# Patient Record
Sex: FEMALE | Race: WHITE | NOT HISPANIC OR LATINO | URBAN - METROPOLITAN AREA
[De-identification: names, ages, dates, MRNs, and addresses within clinical notes are randomized per-mention and may not be internally consistent; named-entity substitution may affect disease eponyms.]

---

## 2018-08-03 ENCOUNTER — CONVERSION ENCOUNTER (OUTPATIENT)
Dept: GENERAL RADIOLOGY | Facility: HOSPITAL | Age: 63
End: 2018-08-03

## 2018-09-20 ENCOUNTER — OFFICE VISIT CONVERTED (OUTPATIENT)
Dept: SURGERY | Facility: CLINIC | Age: 63
End: 2018-09-20
Attending: SURGERY

## 2020-12-18 ENCOUNTER — HOSPITAL ENCOUNTER (OUTPATIENT)
Dept: GENERAL RADIOLOGY | Facility: HOSPITAL | Age: 65
Discharge: HOME | End: 2020-12-18

## 2021-01-26 ENCOUNTER — HOSPITAL ENCOUNTER (OUTPATIENT)
Dept: GENERAL RADIOLOGY | Facility: HOSPITAL | Age: 66
Discharge: HOME | End: 2021-01-26

## 2021-02-04 ENCOUNTER — OFFICE VISIT CONVERTED (OUTPATIENT)
Dept: FAMILY MEDICINE CLINIC | Facility: CLINIC | Age: 66
End: 2021-02-04
Attending: NURSE PRACTITIONER

## 2021-02-04 ENCOUNTER — CONVERSION ENCOUNTER (OUTPATIENT)
Dept: FAMILY MEDICINE CLINIC | Facility: CLINIC | Age: 66
End: 2021-02-04

## 2021-02-09 ENCOUNTER — HOSPITAL ENCOUNTER (OUTPATIENT)
Dept: FAMILY MEDICINE CLINIC | Facility: CLINIC | Age: 66
Discharge: HOME | End: 2021-02-09
Attending: NURSE PRACTITIONER

## 2021-02-10 LAB
25(OH)D3 SERPL-MCNC: 14.8 NG/ML (ref 30–100)
ALBUMIN SERPL-MCNC: 3.9 G/DL (ref 3.5–5)
ALBUMIN/GLOB SERPL: 1.6 {RATIO} (ref 1.4–2.6)
ALP SERPL-CCNC: 89 U/L (ref 43–160)
ALT SERPL-CCNC: 16 U/L (ref 10–40)
ANION GAP SERPL CALC-SCNC: 11 MMOL/L (ref 8–19)
AST SERPL-CCNC: 17 U/L (ref 15–50)
BASOPHILS # BLD AUTO: 0.04 10*3/UL (ref 0–0.2)
BASOPHILS NFR BLD AUTO: 0.7 % (ref 0–3)
BILIRUB SERPL-MCNC: 0.25 MG/DL (ref 0.2–1.3)
BUN SERPL-MCNC: 9 MG/DL (ref 5–25)
BUN/CREAT SERPL: 15 {RATIO} (ref 6–20)
CALCIUM SERPL-MCNC: 9.1 MG/DL (ref 8.7–10.4)
CHLORIDE SERPL-SCNC: 102 MMOL/L (ref 99–111)
CHOLEST SERPL-MCNC: 249 MG/DL (ref 107–200)
CHOLEST/HDLC SERPL: 5 {RATIO} (ref 3–6)
CONV ABS IMM GRAN: 0.02 10*3/UL (ref 0–0.2)
CONV CO2: 30 MMOL/L (ref 22–32)
CONV IMMATURE GRAN: 0.3 % (ref 0–1.8)
CONV TOTAL PROTEIN: 6.4 G/DL (ref 6.3–8.2)
CREAT UR-MCNC: 0.59 MG/DL (ref 0.5–0.9)
DEPRECATED RDW RBC AUTO: 43.8 FL (ref 36.4–46.3)
EOSINOPHIL # BLD AUTO: 0.15 10*3/UL (ref 0–0.7)
EOSINOPHIL # BLD AUTO: 2.6 % (ref 0–7)
ERYTHROCYTE [DISTWIDTH] IN BLOOD BY AUTOMATED COUNT: 13.2 % (ref 11.7–14.4)
GFR SERPLBLD BASED ON 1.73 SQ M-ARVRAT: >60 ML/MIN/{1.73_M2}
GLOBULIN UR ELPH-MCNC: 2.5 G/DL (ref 2–3.5)
GLUCOSE SERPL-MCNC: 92 MG/DL (ref 65–99)
HCT VFR BLD AUTO: 41.5 % (ref 37–47)
HDLC SERPL-MCNC: 50 MG/DL (ref 40–60)
HGB BLD-MCNC: 13.3 G/DL (ref 12–16)
LDLC SERPL CALC-MCNC: 133 MG/DL (ref 70–100)
LYMPHOCYTES # BLD AUTO: 2.27 10*3/UL (ref 1–5)
LYMPHOCYTES NFR BLD AUTO: 39.3 % (ref 20–45)
MCH RBC QN AUTO: 29.2 PG (ref 27–31)
MCHC RBC AUTO-ENTMCNC: 32 G/DL (ref 33–37)
MCV RBC AUTO: 91 FL (ref 81–99)
MONOCYTES # BLD AUTO: 0.38 10*3/UL (ref 0.2–1.2)
MONOCYTES NFR BLD AUTO: 6.6 % (ref 3–10)
NEUTROPHILS # BLD AUTO: 2.91 10*3/UL (ref 2–8)
NEUTROPHILS NFR BLD AUTO: 50.5 % (ref 30–85)
NRBC CBCN: 0 % (ref 0–0.7)
OSMOLALITY SERPL CALC.SUM OF ELEC: 286 MOSM/KG (ref 273–304)
PLATELET # BLD AUTO: 211 10*3/UL (ref 130–400)
PMV BLD AUTO: 11 FL (ref 9.4–12.3)
POTASSIUM SERPL-SCNC: 4.2 MMOL/L (ref 3.5–5.3)
RBC # BLD AUTO: 4.56 10*6/UL (ref 4.2–5.4)
SODIUM SERPL-SCNC: 139 MMOL/L (ref 135–147)
TRIGL SERPL-MCNC: 329 MG/DL (ref 40–150)
TSH SERPL-ACNC: 2.35 M[IU]/L (ref 0.27–4.2)
VLDLC SERPL-MCNC: 66 MG/DL (ref 5–37)
WBC # BLD AUTO: 5.77 10*3/UL (ref 4.8–10.8)

## 2021-05-05 ENCOUNTER — OFFICE VISIT CONVERTED (OUTPATIENT)
Dept: FAMILY MEDICINE CLINIC | Facility: CLINIC | Age: 66
End: 2021-05-05
Attending: NURSE PRACTITIONER

## 2021-05-05 ENCOUNTER — HOSPITAL ENCOUNTER (OUTPATIENT)
Dept: FAMILY MEDICINE CLINIC | Facility: CLINIC | Age: 66
Discharge: HOME | End: 2021-05-05
Attending: NURSE PRACTITIONER

## 2021-05-05 LAB
ALBUMIN SERPL-MCNC: 4.3 G/DL (ref 3.5–5)
ALBUMIN/GLOB SERPL: 1.4 {RATIO} (ref 1.4–2.6)
ALP SERPL-CCNC: 97 U/L (ref 43–160)
ALT SERPL-CCNC: 17 U/L (ref 10–40)
ANION GAP SERPL CALC-SCNC: 13 MMOL/L (ref 8–19)
AST SERPL-CCNC: 22 U/L (ref 15–50)
BILIRUB SERPL-MCNC: 0.39 MG/DL (ref 0.2–1.3)
BUN SERPL-MCNC: 7 MG/DL (ref 5–25)
BUN/CREAT SERPL: 10 {RATIO} (ref 6–20)
CALCIUM SERPL-MCNC: 10.1 MG/DL (ref 8.7–10.4)
CHLORIDE SERPL-SCNC: 104 MMOL/L (ref 99–111)
CHOLEST SERPL-MCNC: 213 MG/DL (ref 107–200)
CHOLEST/HDLC SERPL: 3.6 {RATIO} (ref 3–6)
CONV CO2: 28 MMOL/L (ref 22–32)
CONV TOTAL PROTEIN: 7.3 G/DL (ref 6.3–8.2)
CREAT UR-MCNC: 0.69 MG/DL (ref 0.5–0.9)
GFR SERPLBLD BASED ON 1.73 SQ M-ARVRAT: >60 ML/MIN/{1.73_M2}
GLOBULIN UR ELPH-MCNC: 3 G/DL (ref 2–3.5)
GLUCOSE SERPL-MCNC: 100 MG/DL (ref 65–99)
HDLC SERPL-MCNC: 60 MG/DL (ref 40–60)
LDLC SERPL CALC-MCNC: 119 MG/DL (ref 70–100)
OSMOLALITY SERPL CALC.SUM OF ELEC: 290 MOSM/KG (ref 273–304)
POTASSIUM SERPL-SCNC: 4.4 MMOL/L (ref 3.5–5.3)
SODIUM SERPL-SCNC: 141 MMOL/L (ref 135–147)
TRIGL SERPL-MCNC: 169 MG/DL (ref 40–150)
VLDLC SERPL-MCNC: 34 MG/DL (ref 5–37)

## 2021-05-06 LAB — 25(OH)D3 SERPL-MCNC: 17.5 NG/ML (ref 30–100)

## 2021-05-10 NOTE — H&P
History and Physical      Patient Name: Lilia Flores   Patient ID: [id number]   Sex: Female   YOB: 1955    Primary Care Provider: Jimbo WHARTON   Referring Provider: Kenya WHARTON    Visit Date: February 4, 2021    Provider: DIO Saavedra   Location: [facility]   Location Address: [address] [place] [address], [address] [place] [address]  [id number]   Location Phone: [phone]          Chief Complaint  · Wellness exam      History Of Present Illness  Lilia Flores is a 65 year old /White female who presents for evaluation and treatment of:      65-year-old female comes in for annual physical exam.  She declines a flu shot.  She is a former smoker.  Negative depression screening of 4 points.  Last colorectal screening was October 2018, last breast cancer screening was January 2021.  She is never received the pneumonia vaccine, she has not had any recent falls.  Patient states she seen Dr. Cardoso office previously.  She states she admits she is not taking well enough care of herself.  She does have a strong family history of breast cancers, cervical cancers, diabetes, heart disease, and hyperlipidemia.  Overall patient has no concerns she just wants labs checked she is not fasting today so she will come back next week when fasting 1 morning and get her lab work done. Blood pressure was elevated today at 176/88, patient states she is nervous, heart rate was tachycardia at 118.  She states typically her blood pressure is not that high and she will keep an eye on it at home.  She denies any headache, chest pain or change in vision.    Patient has been off of her Crestor, she was on it previously for hyperlipidemia, she states that her levels were fairly high she is on Crestor 10 mg states she was unable to tolerate the 20 mg.  Patient states she has been out of it several months now and is needing refills.    Patient has a history of GERD, she states that she  wakes up sometimes at night where she has had reflux in her mouth.  She states the morning she does have a dry cough.  She states some days she does have some severe heartburn.  The only thing she is taken for it is TUMS.       Past Medical History  Disease Name Date Onset Notes   Allergies --  --    Anxiety --  --    Arthritis --  --    Arthritis, Rheumatoid --  --    Colitis --  --    Depression --  --    Forgetfulness --  --    Head ache --  --    Head injury --  --    Heart Attack --  --    Heart Disease --  --    High blood pressure --  --    High cholesterol --  --    Kidney stone --  --    Osteoporosis --  --    Precordial chest pain 08/04/2017 --    Reflux Disease --  --    Shortness of Breath --  --    STD (female) --  --    Vitamin D Deficiency --  --          Past Surgical History  Procedure Name Date Notes   Carpal Tunnel Release --  --    Hysterectomy --  --          Medication List  Name Date Started Instructions   Crestor 10 mg oral tablet 02/04/2021 take 1 tablet by oral route once a day (at bedtime) for 90 days         Allergy List  Allergen Name Date Reaction Notes   Codeine Sulfate --  --  --    PENICILLINS --  --  --        Allergies Reconciled  Family Medical History  Disease Name Relative/Age Notes   Dementia Conditions  --    Cancer, Unspecified  --    Cerebrovascular disease  --    Diabetes, unspecified  --    Heart failure  --    Heart Attack (MI)  --    Prostate cancer  --    Thyroid disorder  --          Social History  Finding Status Start/Stop Quantity Notes   Tobacco Former --/-- --  --          Review of Systems  · Constitutional  o Denies  o : fever, fatigue, weight loss, weight gain  · Eyes  o Denies  o : impaired vision, blurred vision, changes in vision  · HENT  o Denies  o : headaches, vertigo, lightheadedness  · Cardiovascular  o Denies  o : lower extremity edema, claudication, chest pressure, palpitations  · Respiratory  o Denies  o : shortness of breath, wheezing, cough,  "hemoptysis, dyspnea on exertion  · Gastrointestinal  o Admits  o : heartburn, reflux  o Denies  o : nausea, vomiting, diarrhea  · Integument  o Denies  o : rash, itching  · Musculoskeletal  o Denies  o : joint pain, joint swelling, muscle pain  · Psychiatric  o Denies  o : anxiety, depression, suicidal ideation, homicidal ideation      Vitals  Date Time BP Position Site L\R Cuff Size HR RR TEMP (F) WT  HT  BMI kg/m2 BSA m2 O2 Sat FR L/min FiO2        02/04/2021 10:17 /88 Sitting    118 - R  97.6 144lbs 2oz 5'  5\" 23.98 1.73 96 %            Physical Examination  · Constitutional  o Appearance  o : well-nourished, well developed, in no acute distress  · Eyes  o Conjunctivae  o : conjunctivae normal, no exudates present  o Sclerae  o : sclerae white  o Pupils and Irises  o : pupils equal and round, and reactive to light and accomodation bilaterally  o Eyelids/Ocular Adnexae  o : extra ocular movements intact  · Respiratory  o Respiratory Effort  o : breathing unlabored, no accessory muscle use  o Inspection of Chest  o : normal appearance, no retractions  o Auscultation of Lungs  o : normal breath sounds bilaterally  · Cardiovascular  o Heart  o :   § Auscultation of Heart  § : regular rate and rhythm, pulmonic murmur heard, gallops or rubs  o Peripheral Vascular System  o :   § Extremities  § : no edema  · Neurologic  o Mental Status Examination  o :   § Orientation  § : alert and oriented x3  § Speech/Language  § : normal speech pattern  o Cranial Nerves  o : crainial nerves 2-12 grossly intact  o Gait and Station  o : normal gait, able to stand without difficulty  · Psychiatric  o Judgement and Insight  o : judgment and insight intact, judgement for everyday activities and social situations within normal limits, insight intact  o Thought Processes  o : rate of thoughts normal, thought content logical  o Mood and Affect  o : mood normal, affect appropriate              Assessment  · Annual physical " exam     V70.0/Z00.00  Will check labs, start back on Crestor  · GERD (gastroesophageal reflux disease)     530.81/K21.9  Will start on omeprazole nightly.  · Hyperlipidemia     272.4/E78.5  Will start back on Crestor and check labs.  · Vitamin D deficiency     268.9/E55.9  · Screening for depression     V79.0/Z13.89  · Newly recognized heart murmur     785.2/R01.1  Will get echo of heart to rule out any acute abnormalities.      Plan  · Orders  o Physical, Primary Care Panel (CBC, CMP, Lipid, TSH) [facility] ([id number], [id number], [id number], [id number]) - V70.0/Z00.00 - 02/09/2021  o Vitamin D Level (52990) - 268.9/E55.9 - 02/09/2021  o ACO-18: Negative screen for clinical depression using a standardized tool () - V79.0/Z13.89 - 02/04/2021  o ACO-18: Negative screen for clinical depression using a standardized tool () - - 02/04/2021   4  o ACO-14: Influenza immunization was not administered for reasons documented [facility] () - - 02/04/2021  o ACO-20: Screening Mammography documented and reviewed [facility] () - - 02/04/2021  o ACO-19: Colorectal cancer screening results documented and reviewed (3017F) - - 02/04/2021  o ACO-39: Current medications updated and reviewed (1159F, ) - - 02/04/2021  o ECHO Follow-up Study 2D [facility] ([id number]) - 785.2/R01.1 - 02/04/2021  · Medications  o omeprazole 40 mg oral capsule,delayed release(DR/EC)   SIG: take 1 capsule by oral route once a day (at bedtime) for 90 days   DISP: (90) Capsule with 1 refills  Prescribed on 02/04/2021     o Crestor 10 mg oral tablet   SIG: take 1 tablet by oral route once a day (at bedtime) for 90 days   DISP: (90) Tablet with 1 refills  Adjusted on 02/04/2021     o Medications have been Reconciled  o Transition of Care or Provider Policy  · Instructions  o Reviewed health maintenance flowsheet and updated information. Orders were placed and/or patient's response was documented.  o Maintain a healthy weight. Avoid tight fitting clothes. Avoid fried, fatty foods, tomato  sauce, chocolate, mint, garlic, onion, alcohol. caffeine. Eat smaller meals, dont lie down after a meal, dont smoke. Elevate the head of your bed 6-9 inches.  o Recommended exercise program to assist with cholesterol, weight loss and overall health improvement.  o Advised that cheeses and other sources of dairy fats, animal fats, fast food, and the extras (candy, pastries, pies, doughnuts and cookies) all contain LDL raising nutrients. Advised to increase fruits, vegetables, whole grains, and to monitor portion sizes.   o Depression Screen completed and scanned into the EMR under the designated folder within the patient's documents.  o Today's PHQ-9 result is _4__  o Take all medications as prescribed/directed.  o Patient was educated/instructed on their diagnosis, treatment and medications prior to discharge from the clinic today.  o Call the office with any concerns or questions.  · Disposition  o Call or Return if symptoms worsen or persist.  o call the office with any questions or concerns  o Follow-up in 3 months            Electronically Signed by: DIO Saavedra -Author on February 4, 2021 11:04:05 AM

## 2021-05-14 VITALS
DIASTOLIC BLOOD PRESSURE: 88 MMHG | BODY MASS INDEX: 24.01 KG/M2 | TEMPERATURE: 97.6 F | HEART RATE: 118 BPM | OXYGEN SATURATION: 96 % | HEIGHT: 65 IN | SYSTOLIC BLOOD PRESSURE: 176 MMHG | WEIGHT: 144.12 LBS

## 2021-05-16 VITALS — WEIGHT: 138 LBS | BODY MASS INDEX: 22.99 KG/M2 | RESPIRATION RATE: 14 BRPM | HEIGHT: 65 IN

## 2021-05-23 ENCOUNTER — TRANSCRIBE ORDERS (OUTPATIENT)
Dept: ADMINISTRATIVE | Facility: HOSPITAL | Age: 66
End: 2021-05-23

## 2021-05-23 DIAGNOSIS — R06.00 DYSPNEA, UNSPECIFIED TYPE: Primary | ICD-10-CM

## 2021-06-05 NOTE — PROGRESS NOTES
Progress Note      Patient Name: Lilia Flores   Patient ID: [id number]   Sex: Female   YOB: 1955    Primary Care Provider: Jimbo WHARTON   Referring Provider: Jimbo WHARTON    Visit Date: May 5, 2021    Provider: DIO Saavedra   Location: [facility]   Location Address: [address] [place] [address], [address] [place] [address]  [id number]   Location Phone: [phone]          Chief Complaint  · 3 month follow-up      History Of Present Illness  Lilia Flores is a 65 year old /White female who presents for evaluation and treatment of:      Patient is a 65-year-old female who comes in for 3-month follow-up.  Last labs she was started on cholesterol medicine, she is currently on Crestor 10 mg.  She is also in need of vitamin D rechecked due to vitamin D deficiency, she is currently on supplement.  Patient states she would also like her blood typing she knows she is Rh- but does not remember what blood type she is.    Patient complains of shortness of breath especially with exertion for over a year.  At last visit heart murmur was heard, this was new for patient, she had seen [facility] in 2017 at that time 2D echo showed some mild regurgitation of the tricuspid valve.  No obvious issues.  She has not seen cardiology since then.  She denies any chest pain.  She states that with any exertion she feels very short of breath and has to sit down and catch her breath.  She is a former smoker states that she quit many years ago but did smoke most of her adult life.       Past Medical History  Disease Name Date Onset Notes   Allergies --  --    Anxiety --  --    Arthritis --  --    Arthritis, Rheumatoid --  --    Colitis --  --    Depression --  --    Forgetfulness --  --    Head ache --  --    Head injury --  --    Heart Attack --  --    Heart Disease --  --    High blood pressure --  --    High cholesterol --  --    Kidney stone --  --    Osteoporosis --  --     Precordial chest pain 08/04/2017 --    Reflux Disease --  --    Shortness of Breath --  --    STD (female) --  --    Vitamin D Deficiency --  --          Past Surgical History  Procedure Name Date Notes   Carpal Tunnel Release --  --    Hysterectomy --  --          Medication List  Name Date Started Instructions   Crestor 10 mg oral tablet 02/04/2021 take 1 tablet by oral route once a day (at bedtime) for 90 days   omeprazole 40 mg oral capsule,delayed release(DR/EC) 02/04/2021 take 1 capsule by oral route once a day (at bedtime) for 90 days   Vitamin D2 1,250 mcg (50,000 unit) oral capsule 02/10/2021 take 1 capsule by oral route weekly         Allergy List  Allergen Name Date Reaction Notes   Codeine Sulfate --  --  --    PENICILLINS --  --  --        Allergies Reconciled  Family Medical History  Disease Name Relative/Age Notes   Dementia Conditions  --    Cancer, Unspecified  --    Cerebrovascular disease  --    Diabetes, unspecified  --    Heart failure  --    Heart Attack (MI)  --    Prostate cancer  --    Thyroid disorder  --          Social History  Finding Status Start/Stop Quantity Notes   Tobacco Former --/-- --  --          Review of Systems  · Constitutional  o Denies  o : fever, fatigue, weight loss, weight gain  · HENT  o Denies  o : headaches, vertigo, lightheadedness  · Cardiovascular  o Denies  o : lower extremity edema, claudication, chest pressure, palpitations  · Respiratory  o Admits  o : shortness of breath, wheezing, cough  o Denies  o : hemoptysis, dyspnea on exertion  · Gastrointestinal  o Denies  o : nausea, vomiting, diarrhea, constipation, abdominal pain  · Integument  o Denies  o : rash, itching, pigmentation changes  · Musculoskeletal  o Denies  o : joint pain, joint swelling, muscle pain  · Psychiatric  o Denies  o : anxiety, depression, suicidal ideation, homicidal ideation      Vitals  Date Time BP Position Site L\R Cuff Size HR RR TEMP (F) WT  HT  BMI kg/m2 BSA m2 O2 Sat FR L/min  "FiO2 HC       05/05/2021 11:27 /80 Sitting    73 - R   146lbs 8oz 5'  5\" 24.38 1.75 96 %            Physical Examination  · Constitutional  o Appearance  o : well-nourished, well developed, in no acute distress  · Eyes  o Conjunctivae  o : conjunctivae normal, no exudates present  o Sclerae  o : sclerae white  o Pupils and Irises  o : pupils equal and round, and reactive to light and accomodation bilaterally  o Eyelids/Ocular Adnexae  o : extra ocular movements intact  · Respiratory  o Respiratory Effort  o : breathing unlabored, no accessory muscle use  o Inspection of Chest  o : normal appearance, no retractions  o Auscultation of Lungs  o : normal breath sounds bilaterally  · Cardiovascular  o Heart  o :   § Auscultation of Heart  § : regular rate, normal rhythm, systolic murmur present, no pericardial friction rub  o Peripheral Vascular System  o :   § Extremities  § : no edema  · Neurologic  o Mental Status Examination  o :   § Orientation  § : alert and oriented x3  § Speech/Language  § : normal speech pattern  o Gait and Station  o : normal gait, able to stand without difficulty  · Psychiatric  o Judgement and Insight  o : judgment and insight intact, judgement for everyday activities and social situations within normal limits, insight intact  o Thought Processes  o : rate of thoughts normal, thought content logical  o Mood and Affect  o : mood normal, affect appropriate              Assessment  · GERD (gastroesophageal reflux disease)     530.81/K21.9  Patient had a longstanding issue with GERD will refer over to general surgery for EGD.  · Hyperlipidemia     272.4/E78.5  Will recheck labs, she is fasting adjust medication if needed.  · Vitamin D deficiency     268.9/E55.9  · Heart murmur on physical examination     785.2/R01.1  Will order a 2D echo of the heart.  · Dyspnea     786.09/R06.00  Will start on albuterol in order PFTs. Discussed return precautions. Patient verbalized understanding.  · Blood " typing encounter     V72.86/Z01.83  · Changes in vision     368.9/H53.9  On exam patient states that she has been having some issues with her vision, she states that she feels like her vision is declining, she is never seen ophthalmology in the past would like a referral for a eye examination.      Plan  · Orders  o General Surgery Consult (GNSUR) - 530.81/K21.9 - 05/05/2021   Dr. mcgowan/  o CMP [facility] ([id number]) - 272.4/E78.5 - 05/05/2021  o Lipid Panel [facility] (02530) - 272.4/E78.5 - 05/05/2021  o Vitamin D Level (94477) - 268.9/E55.9 - 05/05/2021  o ACO - Pt declines to or was not able to provide an Advance Care Plan or name a Surrogate Decision Maker (1124F) - - 05/05/2021  o ACO-13: Fall Risk Screening with no falls in past year or only one fall without injury in the past year (1101F) - - 05/05/2021  o ACO-39: Current medications updated and reviewed (, 1159F) - - 05/05/2021  o ECHO Follow-up Study 2D [facility] (02239) - 785.2/R01.1 - 05/05/2021  o PFT Complete/Full Study (including pre/post) [facility] (79233, 76545, 74607) - 786.09/R06.00 - 05/05/2021  o Blood typing (46818) - V72.86/Z01.83 - 05/05/2021  o OPHTHALMOLOGY CONSULTATION (OPHTH) - 368.9/H53.9 - 05/05/2021  · Medications  o albuterol sulfate 90 mcg/actuation inhalation HFA aerosol inhaler   SIG: inhale 1 - 2 puffs (90 - 180 mcg) by inhalation route every 4- 6 hours as needed   DISP: (1) Canister with 5 refills  Prescribed on 05/05/2021     o Medications have been Reconciled  o Transition of Care or Provider Policy  · Instructions  o Advised that cheeses and other sources of dairy fats, animal fats, fast food, and the extras (candy, pastries, pies, doughnuts and cookies) all contain LDL raising nutrients. Advised to increase fruits, vegetables, whole grains, and to monitor portion sizes.   o Take all medications as prescribed/directed.  o Patient was educated/instructed on their diagnosis, treatment and medications prior to discharge from the clinic  today.  o Call the office with any concerns or questions.  · Disposition  o Call or Return if symptoms worsen or persist.  o follow up as needed  o call the office with any questions or concerns            Electronically Signed by: Jimbo Figueroa APRN -Author on May 5, 2021 12:25:49 PM

## 2021-07-15 VITALS
OXYGEN SATURATION: 96 % | HEART RATE: 73 BPM | DIASTOLIC BLOOD PRESSURE: 80 MMHG | WEIGHT: 146.5 LBS | BODY MASS INDEX: 24.41 KG/M2 | HEIGHT: 65 IN | SYSTOLIC BLOOD PRESSURE: 130 MMHG

## 2021-07-22 ENCOUNTER — TELEPHONE (OUTPATIENT)
Dept: FAMILY MEDICINE CLINIC | Facility: CLINIC | Age: 66
End: 2021-07-22

## 2021-07-22 DIAGNOSIS — K21.9 GASTROESOPHAGEAL REFLUX DISEASE, UNSPECIFIED WHETHER ESOPHAGITIS PRESENT: Primary | ICD-10-CM

## 2021-07-22 NOTE — TELEPHONE ENCOUNTER
Caller: EILEEN     Relationship: Other    Best call back number: [phone]    What was the call regarding: RECEIVED A REFERRAL FOR PATIENT AND STATES THAT PRIOR TO THIS APPOINTMENT PATIENT MUST HAVE UGI/CT SCAN AND EGD DONE PRIOR TO COMING FOR APPOINTMENT. HAS TRIED TO CALL THE OFFICE SEVERAL TIMES. HUB UNABLE TO WARM TRANSFER.  PLEASE ADVISE     Do you require a callback: YES